# Patient Record
Sex: MALE | Race: OTHER | Employment: UNEMPLOYED | URBAN - NONMETROPOLITAN AREA
[De-identification: names, ages, dates, MRNs, and addresses within clinical notes are randomized per-mention and may not be internally consistent; named-entity substitution may affect disease eponyms.]

---

## 2023-01-17 ENCOUNTER — OFFICE VISIT (OUTPATIENT)
Dept: PRIMARY CARE CLINIC | Age: 28
End: 2023-01-17

## 2023-01-17 VITALS
TEMPERATURE: 98.3 F | OXYGEN SATURATION: 97 % | RESPIRATION RATE: 16 BRPM | WEIGHT: 156.6 LBS | SYSTOLIC BLOOD PRESSURE: 108 MMHG | BODY MASS INDEX: 25.17 KG/M2 | HEART RATE: 60 BPM | DIASTOLIC BLOOD PRESSURE: 68 MMHG | HEIGHT: 66 IN

## 2023-01-17 DIAGNOSIS — R23.8 PAPULE OF SKIN: ICD-10-CM

## 2023-01-17 DIAGNOSIS — H61.23 IMPACTED CERUMEN OF BOTH EARS: Primary | ICD-10-CM

## 2023-01-17 DIAGNOSIS — J30.9 ALLERGIC RHINITIS, UNSPECIFIED SEASONALITY, UNSPECIFIED TRIGGER: ICD-10-CM

## 2023-01-17 PROCEDURE — 99203 OFFICE O/P NEW LOW 30 MIN: CPT | Performed by: FAMILY MEDICINE

## 2023-01-17 RX ORDER — CETIRIZINE HYDROCHLORIDE 10 MG/1
10 TABLET ORAL DAILY
COMMUNITY

## 2023-01-17 SDOH — ECONOMIC STABILITY: FOOD INSECURITY: WITHIN THE PAST 12 MONTHS, YOU WORRIED THAT YOUR FOOD WOULD RUN OUT BEFORE YOU GOT MONEY TO BUY MORE.: NEVER TRUE

## 2023-01-17 SDOH — ECONOMIC STABILITY: FOOD INSECURITY: WITHIN THE PAST 12 MONTHS, THE FOOD YOU BOUGHT JUST DIDN'T LAST AND YOU DIDN'T HAVE MONEY TO GET MORE.: NEVER TRUE

## 2023-01-17 ASSESSMENT — ENCOUNTER SYMPTOMS
CONSTIPATION: 0
VOMITING: 0
DIARRHEA: 0
WHEEZING: 0
NAUSEA: 0
ABDOMINAL PAIN: 0
COUGH: 0
SHORTNESS OF BREATH: 0

## 2023-01-17 ASSESSMENT — PATIENT HEALTH QUESTIONNAIRE - PHQ9
SUM OF ALL RESPONSES TO PHQ QUESTIONS 1-9: 11
7. TROUBLE CONCENTRATING ON THINGS, SUCH AS READING THE NEWSPAPER OR WATCHING TELEVISION: 0
SUM OF ALL RESPONSES TO PHQ9 QUESTIONS 1 & 2: 4
9. THOUGHTS THAT YOU WOULD BE BETTER OFF DEAD, OR OF HURTING YOURSELF: 1
5. POOR APPETITE OR OVEREATING: 0
6. FEELING BAD ABOUT YOURSELF - OR THAT YOU ARE A FAILURE OR HAVE LET YOURSELF OR YOUR FAMILY DOWN: 2
1. LITTLE INTEREST OR PLEASURE IN DOING THINGS: 2
SUM OF ALL RESPONSES TO PHQ QUESTIONS 1-9: 10
10. IF YOU CHECKED OFF ANY PROBLEMS, HOW DIFFICULT HAVE THESE PROBLEMS MADE IT FOR YOU TO DO YOUR WORK, TAKE CARE OF THINGS AT HOME, OR GET ALONG WITH OTHER PEOPLE: 1
4. FEELING TIRED OR HAVING LITTLE ENERGY: 2
8. MOVING OR SPEAKING SO SLOWLY THAT OTHER PEOPLE COULD HAVE NOTICED. OR THE OPPOSITE, BEING SO FIGETY OR RESTLESS THAT YOU HAVE BEEN MOVING AROUND A LOT MORE THAN USUAL: 0
2. FEELING DOWN, DEPRESSED OR HOPELESS: 2
SUM OF ALL RESPONSES TO PHQ QUESTIONS 1-9: 11
3. TROUBLE FALLING OR STAYING ASLEEP: 2
SUM OF ALL RESPONSES TO PHQ QUESTIONS 1-9: 11

## 2023-01-17 ASSESSMENT — COLUMBIA-SUICIDE SEVERITY RATING SCALE - C-SSRS
2. HAVE YOU ACTUALLY HAD ANY THOUGHTS OF KILLING YOURSELF?: NO
6. HAVE YOU EVER DONE ANYTHING, STARTED TO DO ANYTHING, OR PREPARED TO DO ANYTHING TO END YOUR LIFE?: NO
1. WITHIN THE PAST MONTH, HAVE YOU WISHED YOU WERE DEAD OR WISHED YOU COULD GO TO SLEEP AND NOT WAKE UP?: NO

## 2023-01-17 ASSESSMENT — SOCIAL DETERMINANTS OF HEALTH (SDOH): HOW HARD IS IT FOR YOU TO PAY FOR THE VERY BASICS LIKE FOOD, HOUSING, MEDICAL CARE, AND HEATING?: NOT HARD AT ALL

## 2023-01-17 NOTE — PROGRESS NOTES
[facility]  DATE OF VISIT : 2023    Patient : Neville Aguero   Age : 32 y.o.  : 1995   MRN : [id number]   ______________________________________________________________________    Chief Complaint :   Chief Complaint   Patient presents with    New Patient     Patient is here today to become established as a new patient today. Currently on Zyrtec started a week ago. Currently vaping and binge drinks on the weekend, counseling will be given. Does not suffer from any medical conditions. Nasal Congestion     Patient states he went to an ENT and was given Zyrtec and Nasal spray to try and help with his congestion and breathing issues. Denies any fractures of his nose or falls. Rash     States over the course of the last year he has had rash on his chest that continues to spread. They look like small skin tags. HPI : Neville Aguero is 32 y.o. male who presented to the clinic today for new patient encounter. Chronic congestion: was seen by ENT a couple of months ago. Was given an inhaler and zyrtec. Still intermittently has SOB mostly at night. When he was using his inhaler he was noticing improvement. Started the Zyrtec about 1week ago and has noticed some improvement but not complete resolution. Cough never occurs when laying down. SOB usually happens at the end of the day and is not associated with exertion. Previous history of cocaine use only used from [age]yo. Alcohol use: drinks about 10-12 beers over the weekend every weekend. Rash: Has been ongoing for years. There is no aggravating factor. Does not cause pain, itching bleeding or drainage. He notes that he has been developing slightly hyperpigmented, papules that once they appear usually become permanent. They do not change in size, color or shape. He does note that his dad developed similar lesions that became more frequent as he aged.        I reviewed the patient's past medications, allergies and past medical history during this visit. Past Medical History :    History reviewed. No pertinent past medical history. History reviewed. No pertinent surgical history. Social History :  Social History       Tobacco History       Smoking Status  Never      Smokeless Tobacco Use  Current      Tobacco Comments  Vaping some days and socially on the weekends              Alcohol History       Alcohol Use Status  Yes Drinks/Week  10 Cans of beer per week Amount  10.0 standard drinks of alcohol/wk Comment  Socially on the weekends              Drug Use       Drug Use Status  Not Currently Types  Cocaine, Marijuana (Weed)              Sexual Activity       Sexually Active  Not Asked                     Allergies :   No Known Allergies    Medication List :    Current Outpatient Medications   Medication Sig Dispense Refill    cetirizine (ZYRTEC) 10 MG tablet Take 10 mg by mouth daily      carbamide peroxide (DEBROX) 6.5 % otic solution Place 5 drops in ear(s) 2 times daily 15 mL 0     No current facility-administered medications for this visit. Review of Systems :  Review of Systems   Constitutional:  Negative for chills, fatigue and fever. Respiratory:  Negative for cough, shortness of breath and wheezing. Cardiovascular:  Negative for chest pain, palpitations and leg swelling. Gastrointestinal:  Negative for abdominal pain, constipation, diarrhea, nausea and vomiting. Endocrine: Negative for polydipsia and polyuria. Neurological:  Negative for dizziness, weakness, light-headedness, numbness and headaches.   ______________________________________________________________________    Physical Exam :    Vitals: /68   Pulse 60   Temp 98.3 °F (36.8 °C) (Temporal)   Resp 16   Ht 5' 6\" (1.676 m)   Wt 156 lb 9.6 oz (71 kg)   SpO2 97%   BMI 25.28 kg/m²   Physical Exam  Vitals reviewed. Constitutional:       General: He is not in acute distress.      Appearance: Normal appearance. HENT:      Right Ear: There is impacted cerumen. Left Ear: There is impacted cerumen. Nose:      Right Turbinates: Enlarged and swollen. Left Turbinates: Enlarged and swollen. Comments: PND  Cardiovascular:      Rate and Rhythm: Normal rate and regular rhythm. Pulses: Normal pulses. Heart sounds: Normal heart sounds. No murmur heard. Pulmonary:      Effort: Pulmonary effort is normal. No respiratory distress. Breath sounds: Normal breath sounds. No wheezing. Abdominal:      General: Bowel sounds are normal. There is no distension. Palpations: Abdomen is soft. Tenderness: There is no abdominal tenderness. Musculoskeletal:      Right lower leg: No edema. Left lower leg: No edema. Skin:     Comments: Slightly hyperpigmented papules on the trunk, there is size variation. All <0.5cm. All have regular borders and color. Neurological:      Mental Status: He is alert.         ___________________    Assessment & Plan :    1. Allergic rhinitis, unspecified seasonality, unspecified trigger  - discussed with patient flonase  - discussed sending prescription but with patient being self pay, we discussed getting the generic over the counter which he opted for  - provided patient with written name of the medication     2. Papule of skin  - non-inflammatory papules  - no evidence or concerns for malignancy  - will watch for now  - discussed with patient alternatives for removing but at the time not causing discomfort  - discussed with patient signs and changes that would require further evaluation     3. Impacted cerumen of both ears  - during exam was unable to visualize TM due to significant cerumen impaction  - trial of debrox (if too expensive patient can try  a drop of hydrogen peroxide daily for a couple of days) to loosen cerumen  - will attempt disempaction in 2-3 weeks.    - carbamide peroxide (DEBROX) 6.5 % otic solution; Place 5 drops in ear(s) 2 times daily  Dispense: 15 mL; Refill: 0    Educational materials and/or home exercises printed for patient's review and were included in patient instructions on his/her After Visit Summary and given to patient at the end of visit. Counseled regarding above diagnosis, including possible risks and complications,  especially if left uncontrolled. Counseled regarding the possible side effects, risks, benefits and alternatives to treatment; patient and/or guardian verbalizes understanding, agrees, feels comfortable with and wishes to proceed with above treatment plan. Advised patient to call with any new medication issues, and read all Rx info from pharmacy to assure aware of all possible risks and side effects of medication before taking. Reviewed age and gender appropriate health screening exams and vaccinations. Advised patient regarding importance of keeping up with recommended health maintenance and to schedule as soon as possible if overdue, as this is important in assessing for undiagnosed pathology, especially cancer, as well as protecting against potentially harmful/life threatening disease. Patient and/or guardian verbalizes understanding and agrees with above counseling, assessment and plan. All questions answered    Additional plan and future considerations:   RTO in 2-3 weeks for cerumen disimpaction    Return to Office: Return in about 3 weeks (around 2/7/2023) for Disimpaction.     Electronically signed by Shayy Haddad MD on 1/17/2023 at 11:53 AM

## 2023-01-31 ENCOUNTER — OFFICE VISIT (OUTPATIENT)
Dept: PRIMARY CARE CLINIC | Age: 28
End: 2023-01-31

## 2023-01-31 VITALS
WEIGHT: 156 LBS | RESPIRATION RATE: 16 BRPM | HEIGHT: 66 IN | HEART RATE: 67 BPM | SYSTOLIC BLOOD PRESSURE: 108 MMHG | OXYGEN SATURATION: 98 % | BODY MASS INDEX: 25.07 KG/M2 | DIASTOLIC BLOOD PRESSURE: 74 MMHG | TEMPERATURE: 97.5 F

## 2023-01-31 DIAGNOSIS — H61.23 IMPACTED CERUMEN OF BOTH EARS: Primary | ICD-10-CM

## 2023-01-31 PROCEDURE — 99999 PR OFFICE/OUTPT VISIT,PROCEDURE ONLY: CPT | Performed by: FAMILY MEDICINE

## 2023-01-31 NOTE — PROGRESS NOTES
[facility]  DATE OF VISIT : 2023    Patient : An Champion   Age : 32 y.o.  : 1995   MRN : [id number]   ______________________________________________________________________    Chief Complaint :   Chief Complaint   Patient presents with    Cerumen Impaction     Patient was able to get the Debrox drops and has been using then in his ears. Will be having his ears irrigated today in office. No other complaints at this time. HPI : An Champion is 32 y.o. male who presented to the clinic today for ear lavage. Bilateral impacted cerumen: noted during his previous visit. Was given debrox, which he applied daily for 2 weeks. Noticed he had a large chunk of wa fall of his right ear after about week 1 of debrox. I reviewed the patient's past medications, allergies and past medical history during this visit. History reviewed. No pertinent past medical history. History reviewed. No pertinent surgical history. Allergies :   No Known Allergies    Medication List :    Current Outpatient Medications   Medication Sig Dispense Refill    cetirizine (ZYRTEC) 10 MG tablet Take 10 mg by mouth daily      carbamide peroxide (DEBROX) 6.5 % otic solution Place 5 drops in ear(s) 2 times daily 15 mL 0     No current facility-administered medications for this visit.      __________________    Assessment & Plan :    1. Impacted cerumen of both ears  Cerumenremoval     Auditory canal(s) left ear completely obstructed with cerumen. A microscope was not used due to deep impaction of the cerumen. Cerumen was gently removed using soft plastic curette, gentle irrigation. Tympanic membranes are intact following the procedure. Auditory canals appear normal.         Educational materials and/or home exercises printed for patient's review and were included in patient instructions on his/her After Visit Summary and given to patient at the end of visit.         Counseled regarding above diagnosis, including possible risks and complications,  especially if left uncontrolled. Counseled regarding the possible side effects, risks, benefits and alternatives to treatment; patient and/or guardian verbalizes understanding, agrees, feels comfortable with and wishes to proceed with above treatment plan. Advised patient to call with any new medication issues, and read all Rx info from pharmacy to assure aware of all possible risks and side effects of medication before taking. Reviewed age and gender appropriate health screening exams and vaccinations. Advised patient regarding importance of keeping up with recommended health maintenance and to schedule as soon as possible if overdue, as this is important in assessing for undiagnosed pathology, especially cancer, as well as protecting against potentially harmful/life threatening disease. Patient and/or guardian verbalizes understanding and agrees with above counseling, assessment and plan. All questions answered    Additional plan and future considerations:   RTO in 1yr for annual exam or as needed. Return to Office: No follow-ups on file.     Electronically signed by Mikel Connor MD on 1/31/2023 at 11:04 AM